# Patient Record
(demographics unavailable — no encounter records)

---

## 2024-10-10 NOTE — ASSESSMENT
[FreeTextEntry1] : chronic back pain- follow up with pain management HCM- check labs Flu shot today

## 2024-10-10 NOTE — HEALTH RISK ASSESSMENT
[Good] : ~his/her~  mood as  good [0] : 2) Feeling down, depressed, or hopeless: Not at all (0) [[id number]] : 0 [Patient reported mammogram was normal] : Patient reported mammogram was normal [Patient reported colonoscopy was normal] : Patient reported colonoscopy was normal [MammogramDate] : 09/24 [ColonoscopyDate] : 11/21

## 2024-10-15 NOTE — ASSESSMENT
[FreeTextEntry1] : 49-year-old right-handed history of chronic migraines, some improvement with Botox therapy still having breakthrough headaches, reviewed and discussed options. Plan schedule her next Botox therapy in 3 months. Sumatriptan 100 mg tablet, 1 as needed for headache, can repeat within 2 hours, maximum dose of 200 mg/day.  Try to avoid more than twice a week. Try Nurtec 75 mg p.o. daily as needed, samples given, patient to give me a call if he finds the medication helpful for prescription. Return to office, 3 months.

## 2024-10-15 NOTE — HISTORY OF PRESENT ILLNESS
[FreeTextEntry1] : 49-year-old woman right-handed with a history of chronic migraines, here for follow-up visit, scheduled both appointment.  Reports overall has noted improvement while on Botox therapy.  Still getting migraines, and can go several days without a headache, but can occasionally have more than 1 or 2/week.  Sumatriptan 100 mg works quite well, has tried Ubrelvy which did not help.

## 2024-11-15 NOTE — PROCEDURE
[FreeTextEntry3] : Date of Service: 11/15/2024   Account: [id number]   Patient: ABRAHAM GARCIA   YOB: 1975   Age: 49 year     Surgeon:  Roger Resendez M.D.   Assistant: None.   Pre-Operative Diagnosis: Spondylosis of lumbar region without myelopathy or radiculopathy   Post Operative Diagnosis:  Spondylosis of lumbar region without myelopathy or radiculopathy   Procedure: Right L3,L4,L5 Medial Branch block                    Left L3,L4,L5  Medial Branch block under fluoroscopic guidance     This procedure was carried out using fluoroscopic guidance.  The risks and benefits of the procedure were discussed extensively with the patient.  The consent of the patient was obtained and the following procedure was performed.    The patient was placed in the prone position.  The patient's back was prepped and draped in a sterile fashion.  The left L4 and L5 lumbar vertebral bodies were identified and the fluoroscope left obliqued to approximately 30 degrees to reveal good "Markos-dog" anatomical view.  The junction of the superior articulate process and tranverse process at the L4 and L5 level was then identified and marked. The skin at these target points was then localized using 1 cc of 1% Lidocaine without epinephrine at each injection site.  A spinal needle was then introduced and advanced to the above target points at the junction of the SAP and transverse processes until oss was contacted.  After negative aspiration for heme and CSF, an injectate of 1cc 0.25% marcaine  was injected at each of the two levels.   The right L4 and L5 lumbar vertebral bodies were identified and the fluoroscope right obliqued to approximately 30 degrees to reveal good "Markos-dog" anatomical view.  The junction of the superior articulate process and tranverse process at the L4 and L5 level was then identified and marked. The skin at these target points was then localized using 1 cc of 1% Lidocaine without epinephrine at each injection site.  A spinal needle was then introduced and advanced to the above target points at the junction of the SAP and transverse processes until oss was contacted.  After negative aspiration for heme and CSF, an injectate of 1cc 0.25% was injected at each of the two levels.    Fluoroscope then focused on the bilateral sacral ala on AP view, and marked at these points.  The skin and subcutaneous structures were localized using 1cc of 1.0 % lidocaine without epinephrine.  A spinal needle was then advanced under fluoroscopic guidance until oss was contacted at the ala bilaterally.  After negative aspiration for heme and CSF, an injectate of 1cc 0.25% marcaine was injected at each site.   The needles were then removed and pressure was applied.  Anesthesia personnel were present throughout the procedure, monitoring vitals which were stable throughout.     Roger Resendez M.D.

## 2024-11-26 NOTE — ASSESSMENT
[FreeTextEntry1] : UTI- Macrobid 100mg bid for 5 days. Chronic back pain- cyclobenzaprine 5mg bid prn and follow up with pain management.  CHRISTIAN- sertraline 50mg daily

## 2024-11-26 NOTE — HISTORY OF PRESENT ILLNESS
[FreeTextEntry8] : Pt had urgency yesterday and dysuria. Started the Macrobid and feels better today

## 2024-11-26 NOTE — REVIEW OF SYSTEMS
[Dysuria] : dysuria [Incontinence] : incontinence [Frequency] : frequency [Negative] : Neurological [FreeTextEntry9] : chronic back pain

## 2024-12-06 NOTE — DISCUSSION/SUMMARY
[de-identified] : will proceed BL LS MBB #2 ES Tylenol prn   I, Ruth Daniel, acting as scribe, attest that this documentation has been prepared under the direction and in the presence of Provider ANIRUDH Cabrera.  The documentation recorded by the scribe, in my presence, accurately reflects the service I personally performed, and the decisions made by me with my edits as appropriate.

## 2024-12-06 NOTE — DISCUSSION/SUMMARY
[de-identified] : will proceed BL LS MBB #2 ES Tylenol prn   I, Ruth Daniel, acting as scribe, attest that this documentation has been prepared under the direction and in the presence of Provider ANIRUDH Cabrera.  The documentation recorded by the scribe, in my presence, accurately reflects the service I personally performed, and the decisions made by me with my edits as appropriate.

## 2024-12-06 NOTE — ASSESSMENT
The ECG revealed a sinus rhythm.  [FreeTextEntry1] : Patient reports >50% reduction in pain for 4-5 days after the procedure with a gradual return to baseline. She tried Gabapentin which reduced her back pain but stopped taking it due to significant fatigue. She takes ibuprofen PRN for  pain. She also uses Cyclobenzaprine PRN with benefit.

## 2024-12-06 NOTE — ASSESSMENT
[FreeTextEntry1] : Patient reports >50% reduction in pain for 4-5 days after the procedure with a gradual return to baseline. She tried Gabapentin which reduced her back pain but stopped taking it due to significant fatigue. She takes ibuprofen PRN for  pain. She also uses Cyclobenzaprine PRN with benefit.

## 2024-12-06 NOTE — HISTORY OF PRESENT ILLNESS
[Lower back] : lower back [Sudden] : sudden [5] : 5 [4] : 4 [Dull/Aching] : dull/aching [Radiating] : radiating [Constant] : constant [Physical therapy] : physical therapy [Lying in bed] : lying in bed [FreeTextEntry1] : B/L L3-L5 MBB-11/15/2024- states that it helped for a few days but now the pain is back , before she had procedure  [] : no [FreeTextEntry7] : bl legs but mainly on the right side  [de-identified] : lumbar mri at [facility]

## 2024-12-06 NOTE — PHYSICAL EXAM
[de-identified] : PHYSICAL EXAM  Constitutional:  Appears well, no apparent distress Ability to communicate: Normal Respiratory: non-labored breathing Skin: no rash noted Head: normocephalic, atraumatic Neck: no visible thyroid enlargement Eyes: extraocular movements intact Neurologic: alert and oriented x3 Psychiatric: normal mood, affect, and behavior  Lumbar Spine:  Palpation: left and right lumbar paraspinal spasm and left and right lumbar paraspinal tenderness to palpation. ROM: Diminished range of motion in all plains.  Patient notes pain with lateral bending to the left and right. MMT: Motor exam is 5/5 through out bilateral lower extremities. Sensation: Light touch and pain is intact throughout bilateral lower extremities. Reflexes: achilles and patella reflexes are intact and  symmetrical.  No sustained clonus. Special Testing: Positive kemps maneuver on the left and right side  Assessemnt: Lumbar spondylosis (m47.816) Myalgia (M79.10)  Plan: After discussing various treatment options with the patient including but not limited to oral medications, physical therapy, exercise modalities as well as interventional spinal injections, we have decided with the following plan: The patient is presenting with axial lumbar pain that has not responded to three months of conservative therapy including physical therapy or nsaid therapy. The pain is interfering with activities of daily living and functionality.  There is no radicular pain.  The pain is exacerbated by facet loading.  Positive kemps maneuver which is defined by pain reproduction with extension and rotation of the lumbar spine to the affected side.  The patient has not had a vertebral fusion at the levels of the proposed treatment.  There is no unexplained neurologic deficit.  There is no bleeding tendency, unstable medical condition, or systemic infection.  The injection is being performed to diagnose the facet joint as the source of the individuals pain.  The risks, benefits and alternatives of the proposed procedure were explained in detail with the patient.  The risks outlined include but are not limited to infection, bleeding, post dural puncture headache, nerve injury, a temporary increase in pain, failure to resolve symptoms, allergic reaction, symptom recurrence, and possible elevation of blood sugar.  All questions were answered to patient's satisfaction and he/she verbalized an understanding.  Follow up 1-2 weeks post injection foe re-evaluation.  Continue home exercises, stretching, activity modification, physical therapy, and conservative care.

## 2024-12-06 NOTE — HISTORY OF PRESENT ILLNESS
[Lower back] : lower back [Sudden] : sudden [5] : 5 [4] : 4 [Dull/Aching] : dull/aching [Radiating] : radiating [Constant] : constant [Physical therapy] : physical therapy [Lying in bed] : lying in bed [FreeTextEntry1] : B/L L3-L5 MBB-11/15/2024- states that it helped for a few days but now the pain is back , before she had procedure  [] : no [FreeTextEntry7] : bl legs but mainly on the right side  [de-identified] : lumbar mri at [facility]

## 2024-12-06 NOTE — PHYSICAL EXAM
[de-identified] : PHYSICAL EXAM  Constitutional:  Appears well, no apparent distress Ability to communicate: Normal Respiratory: non-labored breathing Skin: no rash noted Head: normocephalic, atraumatic Neck: no visible thyroid enlargement Eyes: extraocular movements intact Neurologic: alert and oriented x3 Psychiatric: normal mood, affect, and behavior  Lumbar Spine:  Palpation: left and right lumbar paraspinal spasm and left and right lumbar paraspinal tenderness to palpation. ROM: Diminished range of motion in all plains.  Patient notes pain with lateral bending to the left and right. MMT: Motor exam is 5/5 through out bilateral lower extremities. Sensation: Light touch and pain is intact throughout bilateral lower extremities. Reflexes: achilles and patella reflexes are intact and  symmetrical.  No sustained clonus. Special Testing: Positive kemps maneuver on the left and right side  Assessemnt: Lumbar spondylosis (m47.816) Myalgia (M79.10)  Plan: After discussing various treatment options with the patient including but not limited to oral medications, physical therapy, exercise modalities as well as interventional spinal injections, we have decided with the following plan: The patient is presenting with axial lumbar pain that has not responded to three months of conservative therapy including physical therapy or nsaid therapy. The pain is interfering with activities of daily living and functionality.  There is no radicular pain.  The pain is exacerbated by facet loading.  Positive kemps maneuver which is defined by pain reproduction with extension and rotation of the lumbar spine to the affected side.  The patient has not had a vertebral fusion at the levels of the proposed treatment.  There is no unexplained neurologic deficit.  There is no bleeding tendency, unstable medical condition, or systemic infection.  The injection is being performed to diagnose the facet joint as the source of the individuals pain.  The risks, benefits and alternatives of the proposed procedure were explained in detail with the patient.  The risks outlined include but are not limited to infection, bleeding, post dural puncture headache, nerve injury, a temporary increase in pain, failure to resolve symptoms, allergic reaction, symptom recurrence, and possible elevation of blood sugar.  All questions were answered to patient's satisfaction and he/she verbalized an understanding.  Follow up 1-2 weeks post injection foe re-evaluation.  Continue home exercises, stretching, activity modification, physical therapy, and conservative care.

## 2024-12-19 NOTE — PROCEDURE
[FreeTextEntry3] : Date of Service: 12/19/2024   Account: [id number]   Patient: ABRAHAM GARCIA   YOB: 1975   Age: 49 year     Surgeon:  Roger Resendez M.D.   Assistant: None.   Pre-Operative Diagnosis: Spondylosis of lumbar region without myelopathy or radiculopathy   Post Operative Diagnosis:  Spondylosis of lumbar region without myelopathy or radiculopathy   Procedure: Right L3,L4,L5 Medial Branch block                    Left L3,L4,L5  Medial Branch block under fluoroscopic guidance      This procedure was carried out using fluoroscopic guidance.  The risks and benefits of the procedure were discussed extensively with the patient.  The consent of the patient was obtained and the following procedure was performed.    The patient was placed in the prone position.  The patient's back was prepped and draped in a sterile fashion.  The left L4 and L5 lumbar vertebral bodies were identified and the fluoroscope left obliqued to approximately 30 degrees to reveal good "Markos-dog" anatomical view.  The junction of the superior articulate process and tranverse process at the L4 and L5 level was then identified and marked. The skin at these target points was then localized using 1 cc of 1% Lidocaine without epinephrine at each injection site.  A spinal needle was then introduced and advanced to the above target points at the junction of the SAP and transverse processes until oss was contacted.  After negative aspiration for heme and CSF, an injectate of 1cc 0.25% marcaine  was injected at each of the two levels.   The right L4 and L5 lumbar vertebral bodies were identified and the fluoroscope right obliqued to approximately 30 degrees to reveal good "Markos-dog" anatomical view.  The junction of the superior articulate process and tranverse process at the L4 and L5 level was then identified and marked. The skin at these target points was then localized using 1 cc of 1% Lidocaine without epinephrine at each injection site.  A spinal needle was then introduced and advanced to the above target points at the junction of the SAP and transverse processes until oss was contacted.  After negative aspiration for heme and CSF, an injectate of 1cc 0.25% was injected at each of the two levels.    Fluoroscope then focused on the bilateral sacral ala on AP view, and marked at these points.  The skin and subcutaneous structures were localized using 1cc of 1.0 % lidocaine without epinephrine.  A spinal needle was then advanced under fluoroscopic guidance until oss was contacted at the ala bilaterally.  After negative aspiration for heme and CSF, an injectate of 1cc 0.25% marcaine was injected at each site.   The needles were then removed and pressure was applied.      Roger Resendez M.D.

## 2025-01-08 NOTE — HISTORY OF PRESENT ILLNESS
[Lower back] : lower back [Sudden] : sudden [5] : 5 [4] : 4 [Dull/Aching] : dull/aching [Radiating] : radiating [Constant] : constant [Physical therapy] : physical therapy [Lying in bed] : lying in bed [2] : 2 [FreeTextEntry1] : B/L L3-L5 MBB-12/19/2024- states procedure did not give her relief   B/L L3-L5 MBB-11/15/2024- states that it helped for a few days but now the pain is back , before she had procedure  [] : no [FreeTextEntry7] : bl legs but mainly on the right side  [de-identified] : lumbar mri at [facility]

## 2025-01-08 NOTE — PHYSICAL EXAM
[de-identified] : PHYSICAL EXAM  Constitutional:  Appears well, no apparent distress Ability to communicate: Normal Respiratory: non-labored breathing Skin: no rash noted Head: normocephalic, atraumatic Neck: no visible thyroid enlargement Eyes: extraocular movements intact Neurologic: alert and oriented x3 Psychiatric: normal mood, affect, and behavior  Lumbar Spine:  Palpation: left and right lumbar paraspinal spasm and left and right lumbar paraspinal tenderness to palpation. ROM: Diminished range of motion in all plains.  Patient notes pain with lateral bending to the left and right. MMT: Motor exam is 5/5 through out bilateral lower extremities. Sensation: Light touch and pain is intact throughout bilateral lower extremities. Reflexes: achilles and patella reflexes are intact and  symmetrical.  No sustained clonus. Special Testing: Positive kemps maneuver on the left and right side  Assessemnt: Lumbar spondylosis (m47.816) Myalgia (M79.10)  Plan: After discussing various treatment options with the patient including but not limited to oral medications, physical therapy, exercise modalities as well as interventional spinal injections, we have decided with the following plan: The patient is presenting with axial lumbar pain that has not responded to three months of conservative therapy including physical therapy or nsaid therapy. The pain is interfering with activities of daily living and functionality.  There is no radicular pain.  The pain is exacerbated by facet loading.  Positive kemps maneuver which is defined by pain reproduction with extension and rotation of the lumbar spine to the affected side.  The patient has not had a vertebral fusion at the levels of the proposed treatment.  There is no unexplained neurologic deficit.  There is no bleeding tendency, unstable medical condition, or systemic infection.  The injection is being performed to diagnose the facet joint as the source of the individuals pain.  The risks, benefits and alternatives of the proposed procedure were explained in detail with the patient.  The risks outlined include but are not limited to infection, bleeding, post dural puncture headache, nerve injury, a temporary increase in pain, failure to resolve symptoms, allergic reaction, symptom recurrence, and possible elevation of blood sugar.  All questions were answered to patient's satisfaction and he/she verbalized an understanding.  Follow up 1-2 weeks post injection foe re-evaluation.  Continue home exercises, stretching, activity modification, physical therapy, and conservative care.

## 2025-01-08 NOTE — PHYSICAL EXAM
Quantity Per Injection Site (Units Or Cc): 0.05 cc [de-identified] : PHYSICAL EXAM  Constitutional:  Appears well, no apparent distress Ability to communicate: Normal Respiratory: non-labored breathing Skin: no rash noted Head: normocephalic, atraumatic Neck: no visible thyroid enlargement Eyes: extraocular movements intact Neurologic: alert and oriented x3 Psychiatric: normal mood, affect, and behavior  Lumbar Spine:  Palpation: left and right lumbar paraspinal spasm and left and right lumbar paraspinal tenderness to palpation. ROM: Diminished range of motion in all plains.  Patient notes pain with lateral bending to the left and right. MMT: Motor exam is 5/5 through out bilateral lower extremities. Sensation: Light touch and pain is intact throughout bilateral lower extremities. Reflexes: achilles and patella reflexes are intact and  symmetrical.  No sustained clonus. Special Testing: Positive kemps maneuver on the left and right side  Assessemnt: Lumbar spondylosis (m47.816) Myalgia (M79.10)  Plan: After discussing various treatment options with the patient including but not limited to oral medications, physical therapy, exercise modalities as well as interventional spinal injections, we have decided with the following plan: The patient is presenting with axial lumbar pain that has not responded to three months of conservative therapy including physical therapy or nsaid therapy. The pain is interfering with activities of daily living and functionality.  There is no radicular pain.  The pain is exacerbated by facet loading.  Positive kemps maneuver which is defined by pain reproduction with extension and rotation of the lumbar spine to the affected side.  The patient has not had a vertebral fusion at the levels of the proposed treatment.  There is no unexplained neurologic deficit.  There is no bleeding tendency, unstable medical condition, or systemic infection.  The injection is being performed to diagnose the facet joint as the source of the individuals pain.  The risks, benefits and alternatives of the proposed procedure were explained in detail with the patient.  The risks outlined include but are not limited to infection, bleeding, post dural puncture headache, nerve injury, a temporary increase in pain, failure to resolve symptoms, allergic reaction, symptom recurrence, and possible elevation of blood sugar.  All questions were answered to patient's satisfaction and he/she verbalized an understanding.  Follow up 1-2 weeks post injection foe re-evaluation.  Continue home exercises, stretching, activity modification, physical therapy, and conservative care.

## 2025-01-08 NOTE — HISTORY OF PRESENT ILLNESS
[Lower back] : lower back [Sudden] : sudden [4] : 4 [5] : 5 [Dull/Aching] : dull/aching [Radiating] : radiating [Constant] : constant [Physical therapy] : physical therapy [Lying in bed] : lying in bed [2] : 2 [FreeTextEntry1] : B/L L3-L5 MBB-12/19/2024- states procedure did not give her relief   B/L L3-L5 MBB-11/15/2024- states that it helped for a few days but now the pain is back , before she had procedure  [] : no [FreeTextEntry7] : bl legs but mainly on the right side  [de-identified] : lumbar mri at [facility]

## 2025-01-08 NOTE — PHYSICAL EXAM
[de-identified] : PHYSICAL EXAM  Constitutional:  Appears well, no apparent distress Ability to communicate: Normal Respiratory: non-labored breathing Skin: no rash noted Head: normocephalic, atraumatic Neck: no visible thyroid enlargement Eyes: extraocular movements intact Neurologic: alert and oriented x3 Psychiatric: normal mood, affect, and behavior  Lumbar Spine:  Palpation: left and right lumbar paraspinal spasm and left and right lumbar paraspinal tenderness to palpation. ROM: Diminished range of motion in all plains.  Patient notes pain with lateral bending to the left and right. MMT: Motor exam is 5/5 through out bilateral lower extremities. Sensation: Light touch and pain is intact throughout bilateral lower extremities. Reflexes: achilles and patella reflexes are intact and  symmetrical.  No sustained clonus. Special Testing: Positive kemps maneuver on the left and right side  Assessemnt: Lumbar spondylosis (m47.816) Myalgia (M79.10)  Plan: After discussing various treatment options with the patient including but not limited to oral medications, physical therapy, exercise modalities as well as interventional spinal injections, we have decided with the following plan: The patient is presenting with axial lumbar pain that has not responded to three months of conservative therapy including physical therapy or nsaid therapy. The pain is interfering with activities of daily living and functionality.  There is no radicular pain.  The pain is exacerbated by facet loading.  Positive kemps maneuver which is defined by pain reproduction with extension and rotation of the lumbar spine to the affected side.  The patient has not had a vertebral fusion at the levels of the proposed treatment.  There is no unexplained neurologic deficit.  There is no bleeding tendency, unstable medical condition, or systemic infection.  The injection is being performed to diagnose the facet joint as the source of the individuals pain.  The risks, benefits and alternatives of the proposed procedure were explained in detail with the patient.  The risks outlined include but are not limited to infection, bleeding, post dural puncture headache, nerve injury, a temporary increase in pain, failure to resolve symptoms, allergic reaction, symptom recurrence, and possible elevation of blood sugar.  All questions were answered to patient's satisfaction and he/she verbalized an understanding.  Follow up 1-2 weeks post injection foe re-evaluation.  Continue home exercises, stretching, activity modification, physical therapy, and conservative care.

## 2025-01-08 NOTE — HISTORY OF PRESENT ILLNESS
[Lower back] : lower back [Sudden] : sudden [5] : 5 [4] : 4 [Dull/Aching] : dull/aching [Radiating] : radiating [Constant] : constant [Physical therapy] : physical therapy [Lying in bed] : lying in bed [2] : 2 [FreeTextEntry1] : B/L L3-L5 MBB-12/19/2024- states procedure did not give her relief   B/L L3-L5 MBB-11/15/2024- states that it helped for a few days but now the pain is back , before she had procedure  [] : no [FreeTextEntry7] : bl legs but mainly on the right side  [de-identified] : lumbar mri at [facility]

## 2025-01-21 NOTE — HISTORY OF PRESENT ILLNESS
[FreeTextEntry1] : 49-year-old woman right-handed with a history of chronic migraines, here for follow-up visit, last time seen in the office October 2024.  Reports since she has been on Botox therapy 200 units every 3 months injected muscle in her neck feels as an overall improvement of her headaches.  For the first couple months after she gets her treatment, her headaches become less frequent less severe, not weekly.  But the last 2 weeks before her next burst treatment with Botox, the headache come back and symptoms almost on daily basis.  So far sumatriptan 100 mg works the best, has tried Nurtec, Ubrelvy but not helpful. She is also dealing with chronic problem problems with her back, pain across the lower back radiating more into the left buttock sometimes on the left leg.  No weakness, but worse when she changes positions, even laying down and has to have a pelvic below her knees to raise up the legs.  No numbness tingling of the leg. She has tried physical therapy, several pain management techniques which have not been helpful. For review there is an MRI of the lower back performed October 2023 which is read as tiny left foraminal disc protrusion at L4-5 with mild left neuroforaminal narrowing.  Mild left greater than right L5-S1 neuroforaminal narrowing. And a probable benign atypical hemangioma of the L1 body.  [Chronic Headache] : chronic headache [Dizziness] : dizziness [Nausea] : nausea [Photophobia] : photophobia [Phonophobia] : phonophobia [Neck Pain] : neck pain [___ Times Per Month] : [unfilled] times per month [> 4 hours] : > 4 hours [improved] : improved [Stable] : The patient reports ~his/her~ symptoms since the last visit are stable

## 2025-01-21 NOTE — ASSESSMENT
[FreeTextEntry1] : 49-year-old woman right-handed with a history of chronic migraines, with partial response to Botox therapy.  She has tried Qulipta in the past, not helpful, also injections of Ajovy 225 mg but apparently not covered by insurance. Tolerated procedure well. Reviewed and discussed options, will try to get authorization for Emgality 120 g subcu monthly. Will follow-up with Botox therapy in 3 months. Advise a headache diary. History of chronic back pain, occasionally left-sided sciatica.  Not responding to conservative management. Review to discuss options.  Reports she has tried and gabapentin but made her woozy. Will refer for neurosurgery, she would like a second opinion.

## 2025-02-04 NOTE — CONSULT LETTER
[Dear  ___] : Dear  [unfilled], [Courtesy Letter:] : I had the pleasure of seeing your patient, [unfilled], in my office today. [Sincerely,] : Sincerely, [FreeTextEntry1] : Inga is a very pleasant 49-year-old female patient who was seen in our office today regarding low back pain.  The patient endorses a longstanding history of low back pain dating back almost a decade.  The patient's pain is fairly nebulous in the back and radiates into the buttocks bilaterally.  The patient very rarely experiences pain on the right leg down to below the knee.  The patient denies classic radicular symptoms.  The patient's pain is constant but worse with prolonged standing or sitting.  The patient's pain is also worse at night and in the mornings.  The patient has attempted epidural injections and is reengaging with physical therapy.  The patient has been offered the option of radiofrequency ablation.  The patient endorses an allergy to sulfa medications and Ceclor but does not know her reaction.  The patient endorses anxiety and migraines as part of her medical history.  The patient's current medical regimen includes trazodone, sertraline, and sumatriptan.  On examination, the patient is alert, oriented, and compliant with the exam.  The patient demonstrates full strength in the upper and lower extremities bilaterally.  The patient ambulates well.  The patient demonstrates reasonably good posture.  The patient has difficulty with movements of the lumbar spine secondary to pain and discomfort.  I have no imaging to review today.  The patient is accompanied with a report of an MRI scan from October 20, 2023 which demonstrated a small foraminal disc protrusion at L4/5 as well as right-sided foraminal narrowing at L5/S1 without nerve root compression.  Focal edema was noted in the L4 inferior endplate.  Taken together, the patient has a clinical history and radiographic findings most consistent with muscular causes for low back pain.  Given the longstanding history of her pain which is primarily muscular by description, I have recommended a rheumatologist workup to rule out systemic soft tissue causes for chronic pain and/or fibromyalgia.  The patient endorses her mother was diagnosed with fibromyalgia recently.  The patient was also recently started on gabapentin which provided some relief.  The patient unfortunately does experience significant side effects with gabapentin including drowsiness.  At this time, I explained to the patient that I could offer her updated images including MRI scans and scoliosis images though my suspicion for a new structural lesion is low.  The patient agrees with my assessment and thus we will defer any additional imaging for now.  The patient is aware that she may bring in her CDs from 2023 to review at any time at her request.  The patient will otherwise be following up with our office on an as-needed basis. [FreeTextEntry3] :  Kameron Tubbs MD, PhD, FRCPSC Attending Neurosurgeon [address] [place] [address] Office: [phone] Fax: [phone]

## 2025-02-04 NOTE — CONSULT LETTER
[Dear  ___] : Dear  [unfilled], [Courtesy Letter:] : I had the pleasure of seeing your patient, [unfilled], in my office today. [Sincerely,] : Sincerely, [FreeTextEntry1] : Inga is a very pleasant 49-year-old female patient who was seen in our office today regarding low back pain.  The patient endorses a longstanding history of low back pain dating back almost a decade.  The patient's pain is fairly nebulous in the back and radiates into the buttocks bilaterally.  The patient very rarely experiences pain on the right leg down to below the knee.  The patient denies classic radicular symptoms.  The patient's pain is constant but worse with prolonged standing or sitting.  The patient's pain is also worse at night and in the mornings.  The patient has attempted epidural injections and is reengaging with physical therapy.  The patient has been offered the option of radiofrequency ablation.  The patient endorses an allergy to sulfa medications and Ceclor but does not know her reaction.  The patient endorses anxiety and migraines as part of her medical history.  The patient's current medical regimen includes trazodone, sertraline, and sumatriptan.  On examination, the patient is alert, oriented, and compliant with the exam.  The patient demonstrates full strength in the upper and lower extremities bilaterally.  The patient ambulates well.  The patient demonstrates reasonably good posture.  The patient has difficulty with movements of the lumbar spine secondary to pain and discomfort.  I have no imaging to review today.  The patient is accompanied with a report of an MRI scan from October 20, 2023 which demonstrated a small foraminal disc protrusion at L4/5 as well as right-sided foraminal narrowing at L5/S1 without nerve root compression.  Focal edema was noted in the L4 inferior endplate.  Taken together, the patient has a clinical history and radiographic findings most consistent with muscular causes for low back pain.  Given the longstanding history of her pain which is primarily muscular by description, I have recommended a rheumatologist workup to rule out systemic soft tissue causes for chronic pain and/or fibromyalgia.  The patient endorses her mother was diagnosed with fibromyalgia recently.  The patient was also recently started on gabapentin which provided some relief.  The patient unfortunately does experience significant side effects with gabapentin including drowsiness.  At this time, I explained to the patient that I could offer her updated images including MRI scans and scoliosis images though my suspicion for a new structural lesion is low.  The patient agrees with my assessment and thus we will defer any additional imaging for now.  The patient is aware that she may bring in her CDs from 2023 to review at any time at her request.  The patient will otherwise be following up with our office on an as-needed basis. [FreeTextEntry3] :  Kameron Tubbs MD, PhD, FRCPSC Attending Neurosurgeon [address], [address] [place] [address] Office: [phone] Fax: [phone]

## 2025-03-03 NOTE — DATA REVIEWED
[FreeTextEntry1] : MRI lumbar spine 10/2023: SI joints unremarkable. Shallow disc bulgin throughout lumbar spine; mild facet arthropathy, mild neural foraminal narrowing.

## 2025-03-03 NOTE — REVIEW OF SYSTEMS
[Feeling Tired] : feeling tired [As Noted in HPI] : as noted in HPI [Anxiety] : anxiety [Negative] : Heme/Lymph [Arthralgias] : no arthralgias [Joint Swelling] : no joint swelling [Joint Stiffness] : no joint stiffness

## 2025-03-03 NOTE — PHYSICAL EXAM
[General Appearance - Alert] : alert [General Appearance - In No Acute Distress] : in no acute distress [General Appearance - Well Developed] : well developed [General Appearance - Well-Appearing] : healthy appearing [Sclera] : the sclera and conjunctiva were normal [Extraocular Movements] : extraocular movements were intact [Outer Ear] : the ears and nose were normal in appearance [Neck Appearance] : the appearance of the neck was normal [Exaggerated Use Of Accessory Muscles For Inspiration] : no accessory muscle use [Edema] : there was no peripheral edema [Musculoskeletal - Swelling] : no joint swelling seen [Skin Color & Pigmentation] : normal skin color and pigmentation [] : no rash [Skin Lesions] : no skin lesions [No Focal Deficits] : no focal deficits [Oriented To Time, Place, And Person] : oriented to person, place, and time [Affect] : the affect was normal [Mood] : the mood was normal [FreeTextEntry1] : no joint tenderness or synovitis; left lumbar paraspinal muscle tenderness

## 2025-03-03 NOTE — ASSESSMENT
[FreeTextEntry1] : 49F with migraine headaches, insomnia  on trazodone, anxiety on zoloft, presenting with lower back pain primarily, ongoing for past 10 years. MRI lumbar spine 10/2023 shows unremarkable SI joints and shallow disc bulge in multiple lumbar vertebral levels, with mild facet arthropathy.  Apart from Raynauds, patient denies other signs/symptoms of an autoimmune or inflammatory condition. Regardless, will check serologies for autoimmune conditions which can cause joint pains. If unremarkable, will consider fibromyalgia as a diagnosis of exclusion (she does report nonrestorative sleep/fatigue and anxiety).  will treat for now with cyclobenzaprine 5 mg QHS for back pain- if not effective, will consider duloxetine, but existing SSRI would have to be discontinued in that case- pt made aware.

## 2025-03-03 NOTE — HISTORY OF PRESENT ILLNESS
[Fatigue] : fatigue [Dry Eyes] : dry eyes [FreeTextEntry1] : 49F with migraine headaches, insomnia  on trazodone, anxiety on zoloft, presenting with lower back pain primarily, ongoing for past 10 years. She has tried epidural injections (once worked, once it didnt), has done nerve blocks which have not helped. has been doing PT for a long time. tried gabapentin, made her foggy and stopped it.  does get b/l hip/groin soreness. gets radiating pain down proximal RLE; cyclobenzaprine PRN does help, but she doesnt take it regularly.   No rashes. No oral or nasal ulcers. Left eye dry eye, no dry mouth.  No weight loss. No hematuria.  +Raynauds, mostly on left hand, for years, finers go blue in the cold.  No skin thickening/tightening, no dysphagia.  no ocular inflammatory symptoms.  no abnormal respiratory symptoms.  she is able to work out, has played tennis and pickleball.  nonrestorative sleep, tosses and turns a lot because of lower back pain.   No family hx of autoimmune disease.  [Weight Loss] : no weight loss [Malar Facial Rash] : no malar facial rash [Skin Lesions] : no lesions [Skin Nodules] : no skin nodules [Oral Ulcers] : no oral ulcers [Dry Mouth] : no dry mouth [Dysphagia] : no dysphagia [Shortness of Breath] : no shortness of breath [Arthralgias] : no arthralgias [Joint Swelling] : no joint swelling [Joint Warmth] : no joint warmth [Morning Stiffness] : no morning stiffness [Visual Changes] : no visual changes [Eye Pain] : no eye pain [Eye Redness] : no eye redness

## 2025-04-17 NOTE — HISTORY OF PRESENT ILLNESS
[Chronic Headache] : chronic headache [Dizziness] : dizziness [Nausea] : nausea [Vomiting] : Vomiting [Photophobia] : photophobia [Phonophobia] : phonophobia [Neck Pain] : neck pain [___ Times Per Month] : [unfilled] times per month [> 4 hours] : > 4 hours [improved] : improved [Decreased] : Overall, patient's activity level has decreased [Improved] : The patient reports ~his/her~ symptoms since the last visit have improved [FreeTextEntry1] : 49 yr old woman hx of chronic migraine, chronic back pain; here for f/u.  Reports for the combination of Emgality 120 mg subcu monthly and Botox 20 units every 3 months she has had significant improvement headache, some around 1 headache a week, symptoms more symptoms less average about 5 a month, stress, weather patterns can trigger them.  Sumatriptan 100 mg still works quite well to relieve the headache. [Aura] : no aura [Scotoma] : no scotoma [Numbness] : no numbness [Tingling] : no tingling [Weakness] : no weakness [Scalp Tenderness] : no scalp tenderness

## 2025-04-17 NOTE — ASSESSMENT
[FreeTextEntry1] : 49-year-old right-handed with a history of chronic migraines, very good response carbonation Emgality 120 mg subcu monthly and Botox 20 units every 3 months. We did discuss options. Recommended pushing her next Botox in 4 months, and see if it is just Emgality she just just as well. Advised him that headache diary. Refills as needed. Return to office, 4 months.

## 2025-05-27 NOTE — ASSESSMENT
[FreeTextEntry1] : Insomnia- cut back down to trazodone to 50mg daily- due to am sleepiness.  CHRISTIAN- sertraline 50mg daily restarted for anxiety migraines- followed by neuro

## 2025-05-27 NOTE — PHYSICAL EXAM
[No Acute Distress] : no acute distress [No Respiratory Distress] : no respiratory distress  [Normal Rate] : normal rate  [Soft] : abdomen soft [No Joint Swelling] : no joint swelling [No Rash] : no rash

## 2025-05-27 NOTE — HISTORY OF PRESENT ILLNESS
[FreeTextEntry1] : Renewal of meds for sleep [de-identified] : Pt here today for renewal of meds for sleep Sleep ok. still anxious- stop zoloft Some sleepiness in am.

## 2025-07-11 NOTE — HISTORY OF PRESENT ILLNESS
[5] : 5 [3] : 3 [Dull/Aching] : dull/aching [de-identified] : Pain over the base of L thumb  [FreeTextEntry1] : L hand

## 2025-07-11 NOTE — PHYSICAL EXAM
[de-identified] : L hand: +thumb CMC swelling +thumb CMC tenderness Decreased thumb ROM +Basal grind test    Todays Xrays PA/Lateral/Oblique of the hand/thumb show thumb CMC OA

## 2025-07-11 NOTE — REASON FOR VISIT
[FreeTextEntry2] : 07/11/2025: 50-year-old female here today for: L thumb pain. Denies injury/trauma.

## 2025-07-11 NOTE — ASSESSMENT
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 Treatment options for arthritis include OTC NSAIDS, prescription NSAIDS, ice, bracing, OT, cortisone injection, and surgery. Surgical options include arthroplasty and fusion of the arthritic joint.  I am prescribing Mobic 15mg Daily to alleviate pain and inflammation. Patient advised to take for 1-2 weeks. They were advised of risks of medication including but not limited GI upset and high blood pressure.  Return prn- consider injection

## 2025-07-30 NOTE — HISTORY OF PRESENT ILLNESS
[unknown] : Patient is unsure of the date of her LMP [TextBox_4] : Igna is a 51 y/o  who presents today for an annual exam.  She has had the Mirena IUD since 2021. she has c/o night sweats and hot flashes today.  Advised leaving Provera in for one additional year but starting topical estradiol. Risks vs. benefits reviewed with patient. Rx issued.  She has a history of extremely dense breast tissue. she also has an elevated TC core and is eligible for breast MRI - she has a deductable with insurance and states it is very expensive for her. Pt advised to consider espectially since she has a progesterone IUD and will be starting topical estrogen. [Mammogramdate] : 9/5/24 [BreastSonogramDate] : 9/5/24 [ColonoscopyDate] : 2021 [TextBox_43] : Next: 2031

## 2025-07-30 NOTE — PHYSICAL EXAM
[IUD String] : an IUD string was noted [Appropriately responsive] : appropriately responsive [Alert] : alert [No Acute Distress] : no acute distress [No Lymphadenopathy] : no lymphadenopathy [Oriented x3] : oriented x3 [Examination Of The Breasts] : a normal appearance [No Discharge] : no discharge [No Masses] : no breast masses were palpable [Labia Majora] : normal [Labia Minora] : normal [Normal] : normal [Uterine Adnexae] : normal

## 2025-07-30 NOTE — DISCUSSION/SUMMARY
[FreeTextEntry1] : 1) pap performed 2) rx for screening mammo and sono issued 3) risks vs. benefits ot HT reviewed. Pt with Mirena IUD for endometrial protection. rx for topical estradiol issued, instructions for usage reviewed  REturn to office in 6 months for surveillance.